# Patient Record
(demographics unavailable — no encounter records)

---

## 2024-12-04 NOTE — HISTORY OF PRESENT ILLNESS
[FreeTextEntry1] : 54 y/o hospitalized in [place] 6/24    with acute SOB and chest tightness. Echo done then w/u reportedly negative. She presents today  w PMH 6 mth  chest tightness, SOB with standing breathing better w lying down. Pt saw pulm reportedly WNL, rheumatologist appt next week, Neuro placed pt on gabapentin helped with fluttering in muscles of abd and legs. Has not helped breathing.Pt is unable to do exertional activity. .Pain management does epidural for nerve compression in thoracic spine. [place]ine [place] . Pt has seen several doctors over the last several mths.Pt denies chest pain palpitations syncope or near syncope. She says she is in bed 20 hrs a day for several mths. There are no records from hospitalization to review Pt stated she had low potassium and low mg when in hospital in June and levels have not been checked since

## 2024-12-04 NOTE — DISCUSSION/SUMMARY
[FreeTextEntry1] : 54 y/o hospitalized in [place] 6/24    with acute SOB and chest tightness. Echo done then w/u reportedly negative. She presents today  w PMH 6 mth  chest tightness, SOB with standing breathing better w lying down. Pt saw pulm reportedly WNL, rheumatologist appt next week, Neuro placed pt on gabapentin helped with fluttering in muscles of abd and legs. Has not helped breathing.Pt is unable to do exertional activity. .Pain management does epidural for nerve compression in thoracic spine. Spine surgeon in [place]  doing MRI . Pt has seen several doctors over the last several mths.Pt denies chest pain palpitations syncope or near syncope. She says she is in bed 20 hrs a day for several mths. There are no records from hospitalization to review Pt stated she had low potassium and low mg when in hospital in June and levels have not been checked since. Did not do stress test . Reordered Lexiscan, complete labs and echo but pt said she had in June.  Pt BP elevated pt on bystolic low dose in past and said it worked well without side effects. Will try it and if pt has side effects will inform us. f/u 2 wk for BP check. EKG done  WNL

## 2024-12-04 NOTE — PHYSICAL EXAM
[Well Developed] : well developed [Well Nourished] : well nourished [Normal] : normal S1, S2, no murmur, no rub, no gallop [Normal S1, S2] : normal S1, S2 [No Rub] : no rub [Clear Lung Fields] : clear lung fields [Good Air Entry] : good air entry [No Respiratory Distress] : no respiratory distress  [Soft] : abdomen soft [Non Tender] : non-tender [No Masses/organomegaly] : no masses/organomegaly [Normal Gait] : normal gait [No Edema] : no edema [No Cyanosis] : no cyanosis [No Clubbing] : no clubbing [No Varicosities] : no varicosities [No Rash] : no rash [Moves all extremities] : moves all extremities [No Focal Deficits] : no focal deficits [Appears Anxious] : appears anxious [de-identified] : gets marked SOB w exertion [de-identified] : p/s  voice is weak

## 2024-12-04 NOTE — REVIEW OF SYSTEMS
[Fever] : no fever [Headache] : no headache [Chills] : no chills [Blurry Vision] : no blurred vision [Seeing Double (Diplopia)] : no diplopia [Earache] : no earache [Vertigo] : no vertigo [SOB] : shortness of breath [Dyspnea on exertion] : not dyspnea during exertion [Chest Discomfort] : no chest discomfort [Lower Ext Edema] : no extremity edema [Leg Claudication] : no intermittent leg claudication [Syncope] : no syncope [Wheezing] : no wheezing [Joint Swelling] : no joint swelling [Joint Stiffness] : no joint stiffness [Myalgia] : myalgia [Rash] : no rash [Skin Lesions] : no skin lesions [Dizziness] : no dizziness [Tremor] : no tremor was seen [Numbness (Hypoesthesia)] : numbness [Tingling (Paresthesia)] : tingling [Weakness] : weakness [Limb Weakness (Paresis)] : no limb weakness (Paresis) [Speech Disturbance] : no speech disturbance [Confusion] : no confusion was observed [Under Stress] : not under stress [Negative] : Heme/Lymph [FreeTextEntry5] : chest tightness with breathing [FreeTextEntry6] : see HPI [FreeTextEntry7] : "fluttering in her abd [de-identified] : see hpi

## 2025-02-14 NOTE — HISTORY OF PRESENT ILLNESS
[FreeTextEntry1] : 54 yo asthma. Pleuritic pain resolved.. BP high sitting up. Fluttering at times. Much better. .She has hiatal hernia. Now treadmill 1 hour. 5-7 days week.

## 2025-02-14 NOTE — PHYSICAL EXAM
[Well Developed] : well developed [Normal Conjunctiva] : normal conjunctiva [Normal Venous Pressure] : normal venous pressure [Normal S1, S2] : normal S1, S2 [No Murmur] : no murmur [Clear Lung Fields] : clear lung fields [Soft] : abdomen soft [Non Tender] : non-tender [Normal Gait] : normal gait [No Edema] : no edema [Moves all extremities] : moves all extremities

## 2025-02-14 NOTE — REVIEW OF SYSTEMS
[Dyspnea on exertion] : dyspnea during exertion [Joint Pain] : joint pain [Lower Back Pain] : lower back pain [Fever] : no fever [Chills] : no chills [Blurry Vision] : no blurred vision [Hearing Loss] : no hearing loss [Wheezing] : no wheezing [Abdominal Pain] : no abdominal pain [Dysuria] : no dysuria [Rash] : no rash [Dizziness] : no dizziness [Confusion] : no confusion was observed [Easy Bleeding] : no tendency for easy bleeding

## 2025-02-14 NOTE — ASSESSMENT
[FreeTextEntry1] : 54 yo asthma. She had pleuritic chest pain. In hosp [place].  Fluttering at times. .Feels chest squeezed.inpast.  EKG reviewed Long discussion re symptoms. . Will do a stress echo. Symptoms appear non cardiac.  Relative of miguel ibrahimfriend.  She has white coat syndrome. Told follow low Na diet. Found have hiatal hernia . Blood outside reviewed ldl 134. Reviewed with patient. She needs SE.. F/U Shivani